# Patient Record
(demographics unavailable — no encounter records)

---

## 2018-11-09 NOTE — EKG
Test Reason : 

Blood Pressure : ***/*** mmHG

Vent. Rate : 054 BPM     Atrial Rate : 054 BPM

   P-R Int : 172 ms          QRS Dur : 080 ms

    QT Int : 444 ms       P-R-T Axes : 038 -10 -02 degrees

   QTc Int : 421 ms

 

Sinus bradycardia

Minimal voltage criteria for LVH, may be normal variant

Nonspecific ST-T changes

No previous ECGs available

Confirmed by DR. WALLY CALDERON (3) on 11/9/2018 4:44:36 PM

 

Referred By:  MESERETRO           Confirmed By:DR. WALLY CALDERON

## 2018-11-12 NOTE — OP
DATE OF PROCEDURE:  11/12/2018

 

PREOPERATIVE DIAGNOSES:  Right shoulder impingement with rotator cuff tear and biceps tearing and ins
tability.

 

POSTOPERATIVE DIAGNOSES:

1.  Right shoulder impingement with rotator cuff tear and biceps tearing and instability.

2.  Degenerative tear of the anterior inferior and inferior labrum.

 

PROCEDURES:

1.  Right shoulder arthroscopy with debridement and shaving of degenerative labral tear.

2.  Right shoulder subacromial decompression.

3.  Right shoulder arthroscopic rotator cuff repair.

4.  Open biceps tenodesis.

 

SURGEON:  Ammon Mcleod M.D.

 

ASSISTANT:  Kenny Church PA-C.

 

BLOOD LOSS:  Minimal.

 

COMPLICATIONS:  None.

 

ANESTHESIA:  He had a general anesthetic as well as a preoperative block.

 

IMPLANTS:  We used a triple-loaded titanium anchor for the cuff.  We used a 7 x 23 BioComposite Bio-T
enodesis screw.

 

DISPOSITION:  He did go to the recovery room in stable condition.

 

INDICATIONS:  This is a 68-year-old male, who comes in complaining of problems with his right shoulde
r despite nonoperative treatment.  At this time, he opted to have surgery.

 

DESCRIPTION OF PROCEDURE:  After all appropriate consent forms were explained and signed, he was take
n back to the operating room and at this time, was given a general anesthetic.  Once the level of ane
sthesia was appropriate, he was rolled into the left lateral decubitus position with all bony promine
nces well-padded.  Axillary roll was placed underneath the left axilla and the bean bag was inflated 
to hold him in this position.  The arm was taken through full range of motion, and at this time, was 
hung up with 15 pounds of traction in standard fashion.  The right shoulder and upper extremity were 
then prepped and draped in the standard surgical fashion.  Bony anatomic landmarks were drawn out and
 the subacromial space was infiltrated with Marcaine and epinephrine.  Posterior portal was establish
ed and the scope was placed into the shoulder joint.  Anterior working portal was made using a needle
-localization technique.  Diagnostic arthroscopy commenced in the glenohumeral joint.  The articular 
surface of the humeral head and glenoid were in good condition.  There was a significant synovitis an
d tearing of the biceps around the bicep tendon insertion onto the superior labrum and this area was 
found to be unstable.  The patient had a significant degenerative anterior inferior and inferior labr
al tear.  Shaver was used to debride this tissue.  No loose bodies were noted in the axillary pouch. 
 Posterior labrum was in good condition.  At this time, a green cannula was placed into the shoulder 
and the shaver and the surface energy were used to debride and remove any significant synovitic tissu
e.  At this time, we then took an 18-gauge needle and used this to place a suture through our biceps 
tendon.  We then cut the biceps off of its superior labral insertion with the scissors, and once this
 was done, we looked at the rotator cuff one more time and really felt that there was intact as well 
as the subscap.  At this time, we replaced our camera into the subacromial space.  A lateral working 
portal was made.  The bursa was removed from off the underlying rotator cuff and a small decompressio
n was performed using the shaver and the surface energy.  At this time, we were able to evaluate our 
cuff.  There appeared to be a full thickness tear, and upon evaluation of this tear, it looked like t
hroughout the entire width of the tear, the underlying rotator cuff tissue was taut and intact.  Ther
efore, we decided to repair significant bursal surface tear to the underlying tissue believing that t
issue intact.  At this time, we roughened up our tuberosity to remove any soft tissue.  We also rough
ened up between the two surfaces of the rotator cuff with the shaver to promote healing and bleeding.
  Through a secondary stab incision, a triple-loaded metal anchor was placed into the tuberosity.  Th
e sutures were pulled out the front.  We then placed a passport cannula on the lateral portal.  Throu
gh this passport cannula, we then used the Scorpion device to pass sutures through the bursal surface
 of the tear using a combination of mattress as well as simple sutures.  Once these were tied, this g
ave us a nice repair with good stability.  It was felt that we did not need to augment this with a do
uble row, and thus, sutures were cut.  At this time, we removed the scope and drained the shoulder.  
At this point, we went about performing our open biceps tenodesis.  From the previous suture placemen
t, a 15-blade was used to make an incision distal to this, down through skin only.  A Bovie was used 
to coagulate any brisk venous bleeding.  We were then able to sharply open the fascia of the deltoid 
right where the suture was located and finger dissection was used to get down to the underlying trans
verse humeral ligament in the bicipital groove.  Retractors were placed so we could directly visualiz
e this.  We then opened up our transverse humeral ligament and pulled our biceps tendon out into the 
wound.  Once this was done, we then used a Bovie to coagulate any brisk venous bleeding and cleaned o
ut the significant amount of synovitic tissue from the bicipital groove.  This took a fair amount of 
time to do this.  Once this was done, I thoroughly irrigated and dried to make sure hemostasis had be
en achieved.  At this time, we then used the ArthroSew to stitch our biceps tendon and removed the in
traarticular portion of the biceps tendon.  We then placed our pin, reamed over this with a 7.5 mm re
amer to a depth of 25, and placed our 7 x 23 BioComposite Bio-Tenodesis screw in standard fashion.  W
e then tied our sutures on top of this, so the screw could not back out.  This gave us nice fixation 
of our biceps tendon.  We then thoroughly irrigated and dried our wound.  We allowed our deltoid to c
lose upon itself.  A running Vicryl was used to close our deltoid fascia.  A 2-0 Vicryl and nylon sut
ures were then used to close this incision as well as the portals.  A bulky sterile dressing was appl
ied.  The patient was awakened and taken to the recovery room in stable condition.  All counts were c
orrect at the end of the case and he did receive preoperative IV antibiotics.

## 2019-03-11 NOTE — MRI
MRI OF RIGHT KNEE PERFORMED WITHOUT CONTRAST ENHANCEMENT:

 

HISTORY: 

The patient fell Friday with right knee pain lateral aspect with contusion and swelling.

 

FINDINGS: 

The anterior and posterior cruciate ligaments are intact.

 

The medial and lateral menisci are fairly normal in shape and appearance.  The only equivocal finding
 is what may be a tiny free edge tear of the junction of the posterior horn and body of the medial me
niscus, equivocal finding given some motion artifact.  

 

The medial and lateral collateral ligaments and iliotibial band regions are intact.

 

The patella is slightly laterally subluxed.  There are diffuse edema changes within the subcutaneous 
tissue.  These changes are particularly prominent anteriorly with associated hematoma noted.  This wa
s related to a partial tear of the quadriceps tendon.  This tear involves the vastus medialis and vas
tus lateralis tendon contribution to the quadriceps tendon as well as the rectus femoris tendon.  The
 rectus femoris tendon is retracted by approximately 2.5 cm.  There is preservation of the vastus int
ermedius tendon.  There are edema changes extending into the vastus lateralis tendon.  The vastus lat
eralis tendon component is slightly more significantly retracted and very disorganized in appearance.
  In addition, there are edema changes suggested associated MPFL and medial patellar retinacular inju
ry.  The lateral patellar retinaculum is intact.  Patellar tendon is normal in appearance.  

 

IMPRESSION: 

1.  High-grade partial tear of the quadriceps tendon.  The tear involves the vastus medialis and late
ralis as well as rectus femoris components with the rectus femoris component retracted by 2.5 cm.  Th
ere is preservation of the vastus intermedius tendon.  There is more significant injury along the vas
tus lateralis muscle with a very disorganized tendon component on this side and edema change extendin
g into the vastus lateralis muscle.  

 

2.  In addition to these findings, there is evidence for medial patellofemoral ligament and medial pa
tellar retinacular injuries.  The patella is slightly laterally subluxed.

 

POS: [facility]

## 2019-03-13 NOTE — OP
DATE OF PROCEDURE:  03/13/2019



PREOPERATIVE DIAGNOSIS:  Right-sided quad tendon rupture.



POSTOPERATIVE DIAGNOSIS:  Right-sided quad tendon rupture.



PROCEDURE PERFORMED:  Right open quad tendon repair.



ASSISTANT:  Lisandro Torre PA-C.



BLOOD LOSS:  Minimal.



COMPLICATIONS:  None.



ANESTHESIA:  He had a one time femoral block.  He also had a general anesthetic.



IMPLANTS:  We used two 4.75 BioComposite SwiveLock in the patella.  We also used

drill holes and #5 Ethibond suture for repair. 



DISPOSITION:  He did go to recovery room in stable condition.



INDICATIONS:  A 68-year-old active male, was in the [facility] [place] last week

when he slipped, tore his quad tendon, and was now presenting for repair. 



DESCRIPTION OF PROCEDURE:  After all appropriate consent forms were explained and

signed, he was taken back to the operative room and at this time was given general

anesthetic.  Once the level of anesthesia was appropriate, a tourniquet was placed

onto the right thigh and the leg was then prepped and draped in standard surgical

fashion.  The limb was exsanguinated and tourniquet taken to 300 mmHg.  A midline

incision was made with 10 blade down through skin and Bovie was used to coagulate

any brisk venous bleeding.  At this time, a humongous amount of blood clot as well

as blood was evacuated from the knee joint and the tendon rupture.  At this time, we

had a good visualization of the tear.  The very deep intermedius centrally was

intact, everything else was completely torn with retinacular tears going all the way

up to beyond the midline.  This was thoroughly irrigated and dried.  At this time,

we took two #5 Ethibond starting distally through the central portion of the quad

tendon, working all way up proximally, and coming back down distally in [place]

fashion.  These four strings were then put through three drill holes through the

patella.  These were not tied at this time.  We then drilled, tapped, and placed a

4.75 BioComposite SwiveLock on the superior medial and superior lateral aspect of

the patella.  The suture tape as well as remaining set of suture were then used to

get nice healthy bites of tissue on the medial and lateral side of our central

tendon rupture for repair as well.  At this time, we pulled all the sutures tight.

We then tied our central tendon sutures and then tied our medial and lateral side

sutures at this time.  This gave us a nice repair of our entire quadriceps tendon.

We then placed multiple interrupted #2 Vicryl sutures along the medial and lateral

retinacular splits to close these retinacular tears.  The wound was then thoroughly

irrigated and dried.  We then closed some soft tissue over our knots followed by

some interrupted 0 Vicryl, followed by a running 2-0 Stratafix and 3-0 Stratafix to

close the subcuticular skin.  Surgicel skin glue was used over top of this.  Once

this dried, a bulky sterile soft tissue dressing was applied and the tourniquet was

let down.  The patient's toes pinked up nicely.  He was then awakened.  He was taken

to recovery room in stable condition.  All counts were correct at the end of the

case and he did receive preoperative IV antibiotics. 







Job ID:  [id number]

## 2020-05-03 NOTE — CT
CT CERVICAL SPINE WITH CORONAL AND SAGITTAL REFORMATIONS:

 

History: Injury, neck pain. 

 

FINDINGS: 

There is loss of cervical lordosis. Multilevel degenerative changes are present. No acute fracture, s
ubluxation, or facet malalignment is identified. No abnormal prevertebral soft tissue swelling is see
n. The upper lung fields are clear. 

 

IMPRESSION: 

No CT evidence of cervical spinal fracture or traumatic subluxation.

 

POS: [facility]

## 2020-05-03 NOTE — CT
CT BRAIN WITHOUT CONTRAST:

 

History: Injury. Headache. 

 

FINDINGS: 

No acute infarct, hemorrhage, midline shift or abnormal extraaxial fluid collections are seen. The ve
ntricular size is appropriate and the basilar air cells patent. There are changes of chronic small ve
ssel ischemic disease in the periventricular white matter. The bony calvarium is intact. The visualiz
ed paranasal sinuses and mastoid air cells are well aerated. 

 

IMPRESSION: 

No CT evidence of acute intracranial process. 

 

POS: [facility]

## 2020-05-03 NOTE — CT
CT CHEST WITH IV CONTRAST

CT ABDOMEN WITH IV CONTRAST

CT PELVIS WITH IV CONTRAST

REFORMATED CT IMAGES OF THE THORACOLUMBAR SPINE:

 

History: Injury, chest pain, abdominal pain, back pain. 

 

FINDINGS: 

No hematoma is seen. No pleural effusions are identified. There are vascular calcifications without e
vidence of dilatation of the thoracoabdominal aorta. The thoracic aorta is ectatic. No pneumothoraces
 or pulmonary contusions are seen. 

 

No free air is seen. There is a tiny amount of free fluid in the left side of the urinary gutter and 
pelvis. This is secondary to the fractures of the left superior and inferior ramus. The liver, spleen
, pancreas, adrenal glands, and kidneys are intact. The small bowel loops are not abnormally dilated.
 A normal appendix is present. The urinary bladder appears distended. 

 

There are degenerative changes in the thoracolumbar spine without fracture or subluxation. There is a
 fracture of the left 4th rib. 

 

IMPRESSION: 

1. No CT evidence of acute intrathoracic or solid organ injury. 

2. Fracture of the left 4th rib, left superior and inferior pubic rami.

 

POS: [facility]